# Patient Record
Sex: MALE | Race: AMERICAN INDIAN OR ALASKA NATIVE
[De-identification: names, ages, dates, MRNs, and addresses within clinical notes are randomized per-mention and may not be internally consistent; named-entity substitution may affect disease eponyms.]

---

## 2019-08-15 ENCOUNTER — HOSPITAL ENCOUNTER (EMERGENCY)
Dept: HOSPITAL 5 - ED | Age: 24
Discharge: HOME | End: 2019-08-15
Payer: SELF-PAY

## 2019-08-15 VITALS — SYSTOLIC BLOOD PRESSURE: 124 MMHG | DIASTOLIC BLOOD PRESSURE: 72 MMHG

## 2019-08-15 DIAGNOSIS — K08.89: Primary | ICD-10-CM

## 2019-08-15 PROCEDURE — 99282 EMERGENCY DEPT VISIT SF MDM: CPT

## 2019-08-15 NOTE — EMERGENCY DEPARTMENT REPORT
ED ENT HPI





- General


Chief complaint: Dental/Oral


Stated complaint: RT SIDE TOOTHACHE/HEADACHE PAIN


Time Seen by Provider: 08/15/19 09:32


Source: patient


Mode of arrival: Ambulatory


Limitations: No Limitations





- History of Present Illness


Initial comments: 





23-year-old -American male with a past medical medical history of 

recurrent dentalgia secondary to diffuse dental caries presents emergency 

department complaining of pain to the right upper tooth which radiates up to his

head causes pain.  Pain is worse when he eats and chews and spontaneously 

resolves.  Currently states the pain has improved to a 0 of 10, and just 

requests a work note to return to work report.  No odynophagia or dysphagia, 

reports no fever, chills, sweats.  No chest pain or palpitations.  No nausea or 

vomiting


MD complaint: tooth pain


-: Gradual, month(s)


Location: tooth #


Severity: moderate (when Pain is present it is moderate in throbbing)


Quality: aching


Consistency: intermittent


Improves with: none


Worsens with: none


Context- Dental: history of dental caries


Associated Symptoms: toothache.  denies: pain with swallowing, sore throat





- Related Data


                                  Previous Rx's











 Medication  Instructions  Recorded  Last Taken  Type


 


Chlorhexidine Mouthwash [Peridex] 15 ml MM BID #420 bottle 08/15/19 Unknown Rx


 


Ketorolac [Toradol] 10 mg PO Q8HR PRN #10 tablet 08/15/19 Unknown Rx











                                    Allergies











Allergy/AdvReac Type Severity Reaction Status Date / Time


 


No Known Allergies Allergy   Unverified 08/15/19 08:32














ED Dental HPI





- General


Chief complaint: Dental/Oral


Stated complaint: RT SIDE TOOTHACHE/HEADACHE PAIN


Time Seen by Provider: 08/15/19 09:32


Source: patient


Mode of arrival: Ambulatory


Limitations: No Limitations





- Related Data


                                  Previous Rx's











 Medication  Instructions  Recorded  Last Taken  Type


 


Chlorhexidine Mouthwash [Peridex] 15 ml MM BID #420 bottle 08/15/19 Unknown Rx


 


Ketorolac [Toradol] 10 mg PO Q8HR PRN #10 tablet 08/15/19 Unknown Rx











                                    Allergies











Allergy/AdvReac Type Severity Reaction Status Date / Time


 


No Known Allergies Allergy   Unverified 08/15/19 08:32














ED Review of Systems


ROS: 


Stated complaint: RT SIDE TOOTHACHE/HEADACHE PAIN


Other details as noted in HPI





Comment: All other systems reviewed and negative





ED Past Medical Hx





- Past Medical History


Previous Medical History?: No





- Surgical History


Past Surgical History?: No





- Social History


Smoking Status: Never Smoker


Substance Use Type: None





- Medications


Home Medications: 


                                Home Medications











 Medication  Instructions  Recorded  Confirmed  Last Taken  Type


 


Chlorhexidine Mouthwash [Peridex] 15 ml MM BID #420 bottle 08/15/19  Unknown Rx


 


Ketorolac [Toradol] 10 mg PO Q8HR PRN #10 tablet 08/15/19  Unknown Rx














ED Physical Exam





- General


Limitations: No Limitations


General appearance: alert, in no apparent distress





- Head


Head exam: Present: atraumatic, normocephalic





- Eye


Eye exam: Present: normal appearance





- ENT


ENT exam: Present: mucous membranes moist, other (diffuse dental caries with 

severe erosions to tooth number 05/06/2014 22 on and 30.  Common uvula are 

midline.  Airway is patent.  No exudate.)





- Neck


Neck exam: Present: normal inspection, full ROM





- Respiratory


Respiratory exam: Present: normal lung sounds bilaterally.  Absent: respiratory 

distress





- Cardiovascular


Cardiovascular Exam: Present: regular rate, normal rhythm.  Absent: systolic 

murmur, diastolic murmur, rubs, gallop





- GI/Abdominal


GI/Abdominal exam: Present: soft, normal bowel sounds





- Rectal


Rectal exam: Present: deferred





- Extremities Exam


Extremities exam: Present: normal inspection





- Back Exam


Back exam: Present: normal inspection





- Neurological Exam


Neurological exam: Present: alert, oriented X3, CN II-XII intact





- Psychiatric


Psychiatric exam: Present: normal affect, normal mood





- Skin


Skin exam: Present: warm, dry, intact, normal color.  Absent: rash





ED Course





                                   Vital Signs











  08/15/19





  08:45


 


Temperature 98.4 F


 


Pulse Rate 56 L


 


Respiratory 18





Rate 


 


Blood Pressure 124/72


 


O2 Sat by Pulse 100





Oximetry 











Critical care attestation.: 


If time is entered above; I have spent that time in minutes in the direct care 

of this critically ill patient, excluding procedure time.








ED Disposition


Clinical Impression: 


 Dentalgia





Disposition: DC-01 TO HOME OR SELFCARE


Is pt being admited?: No


Does the pt Need Aspirin: No


Condition: Stable


Instructions:  Dental Caries (ED), Toothache (ED)


Prescriptions: 


Chlorhexidine Mouthwash [Peridex] 15 ml MM BID #420 bottle


Ketorolac [Toradol] 10 mg PO Q8HR PRN #10 tablet


 PRN Reason: Pain


Referrals: 


[facility] [Outside] - 3-5 Days


Forms:  Work/School Release Form(ED)